# Patient Record
Sex: FEMALE | Race: WHITE
[De-identification: names, ages, dates, MRNs, and addresses within clinical notes are randomized per-mention and may not be internally consistent; named-entity substitution may affect disease eponyms.]

---

## 2019-06-21 ENCOUNTER — HOSPITAL ENCOUNTER (OUTPATIENT)
Dept: HOSPITAL 75 - RAD | Age: 29
End: 2019-06-21
Attending: OBSTETRICS & GYNECOLOGY
Payer: MEDICAID

## 2019-06-21 DIAGNOSIS — Z36.89: Primary | ICD-10-CM

## 2019-06-21 DIAGNOSIS — Z3A.19: ICD-10-CM

## 2019-06-21 PROCEDURE — 76805 OB US >/= 14 WKS SNGL FETUS: CPT

## 2019-06-21 NOTE — DIAGNOSTIC IMAGING REPORT
INDICATION: Fetal survey.



TECHNIQUE: Multiple real-time grayscale images were obtained over

the gravid uterus.



COMPARISON: None.



FINDINGS: There is a single live fetus in a cephalic

presentation. Fetal heart rate was recorded at 160 beats per

minute. Placenta is anterior and fundal. Amniotic fluid volume

appears within normal limits. Cervical length is 3.9 cm. Fetal

survey demonstrates fetal kidneys, bladder, and stomach to be

unremarkable. The brain is unremarkable. There is a four-chamber

heart. There is a three-vessel cord. Cord insertion was not well

visualized on today's study. The spine is unremarkable.



Biometrical measurements are as follows:

Biparietal 4.72 cm, age 20 weeks 2 days.

Head circumference 17.1 cm, age 19 weeks 5 days.

Abdominal circumference 13.6 cm, age 19 weeks 1 days.

Femur length 3.1 cm, age 19 weeks 4 days.



Sonographic estimate age: 19 weeks 5 days.

Sonographic estimated date of delivery: 11/10/2019.



Estimated Fetal Weight: 286 gm (+/- 42  gm).

LMP percentile: 14%.



Fetal heart rate: 160 beats per minute.



Fetal number: 1 of 1.



IMPRESSION: Single live IUP of approximately 19 weeks 5 days

gestational age with estimated date of confinement

sonographically of 11/10/2019. Fetal survey is unremarkable,

although fetal cord insertion was not well demonstrated on

today's study.



Dictated by: 



  Dictated on workstation # [id number]

## 2019-08-22 ENCOUNTER — HOSPITAL ENCOUNTER (OUTPATIENT)
Dept: HOSPITAL 75 - RAD | Age: 29
End: 2019-08-22
Attending: OBSTETRICS & GYNECOLOGY
Payer: MEDICAID

## 2019-08-22 DIAGNOSIS — Z3A.29: ICD-10-CM

## 2019-08-22 DIAGNOSIS — O26.842: Primary | ICD-10-CM

## 2019-08-22 DIAGNOSIS — O21.0: ICD-10-CM

## 2019-08-22 PROCEDURE — 76805 OB US >/= 14 WKS SNGL FETUS: CPT

## 2019-08-22 NOTE — DIAGNOSTIC IMAGING REPORT
INDICATION: Smaller than dates.



TECHNIQUE: Multiple real-time grayscale images were obtained over

the gravid uterus.



COMPARISON: None 06/21/2019.



FINDINGS: There is a single live fetus in a cephalic

presentation. Fetal heart rate was recorded at 140 beats per

minute. Placenta is anterior. Amniotic fluid volume is 17.8 cm.

Survey shows fetal kidneys, bladder stomach to be unremarkable.

The brain is unremarkable. There appears to be a two-vessel cord

present. Cord insertion is unremarkable.



Biometrical measurements are as follows:

Biparietal 7.4 cm, age 29 weeks 4 days.

Head circumference 26.4 cm, age 28 weeks 6 days.

Abdominal circumference 24.3 cm, age 28 weeks 5 days.

Femur length 5.4 cm, age 28 weeks 6 days.



Sonographic estimate age: 29 weeks 0 days.

Sonographic estimated date of delivery: 11/07/2019.



Estimated Fetal Weight: 1270 G gm (+/- 185  gm).

LMP percentile: 31%%.



Fetal heart rate: 140 beats per minute.



Fetal number: 1 of 1.



IMPRESSION: Single live IUP approximately 29 weeks gestational

age showing normal interval growth when compared with prior exam

from 06/21/2019. Fetus appears to have a two-vessel cord.

Followup can be performed.



Dictated by: 



  Dictated on workstation # [id number]

## 2019-11-04 ENCOUNTER — HOSPITAL ENCOUNTER (INPATIENT)
Dept: HOSPITAL 75 - LDRP | Age: 29
LOS: 2 days | Discharge: HOME | End: 2019-11-06
Attending: OBSTETRICS & GYNECOLOGY | Admitting: OBSTETRICS & GYNECOLOGY
Payer: MEDICAID

## 2019-11-04 VITALS — DIASTOLIC BLOOD PRESSURE: 73 MMHG | SYSTOLIC BLOOD PRESSURE: 120 MMHG

## 2019-11-04 VITALS — DIASTOLIC BLOOD PRESSURE: 73 MMHG | SYSTOLIC BLOOD PRESSURE: 122 MMHG

## 2019-11-04 VITALS — SYSTOLIC BLOOD PRESSURE: 118 MMHG | DIASTOLIC BLOOD PRESSURE: 67 MMHG

## 2019-11-04 VITALS — DIASTOLIC BLOOD PRESSURE: 69 MMHG | SYSTOLIC BLOOD PRESSURE: 117 MMHG

## 2019-11-04 VITALS — DIASTOLIC BLOOD PRESSURE: 88 MMHG | SYSTOLIC BLOOD PRESSURE: 133 MMHG

## 2019-11-04 VITALS — SYSTOLIC BLOOD PRESSURE: 125 MMHG | DIASTOLIC BLOOD PRESSURE: 73 MMHG

## 2019-11-04 VITALS — BODY MASS INDEX: 23.73 KG/M2 | WEIGHT: 142.42 LBS | HEIGHT: 65 IN

## 2019-11-04 VITALS — SYSTOLIC BLOOD PRESSURE: 113 MMHG | DIASTOLIC BLOOD PRESSURE: 66 MMHG

## 2019-11-04 VITALS — SYSTOLIC BLOOD PRESSURE: 129 MMHG | DIASTOLIC BLOOD PRESSURE: 74 MMHG

## 2019-11-04 VITALS — DIASTOLIC BLOOD PRESSURE: 59 MMHG | SYSTOLIC BLOOD PRESSURE: 113 MMHG

## 2019-11-04 VITALS — DIASTOLIC BLOOD PRESSURE: 67 MMHG | SYSTOLIC BLOOD PRESSURE: 125 MMHG

## 2019-11-04 VITALS — SYSTOLIC BLOOD PRESSURE: 118 MMHG | DIASTOLIC BLOOD PRESSURE: 74 MMHG

## 2019-11-04 VITALS — SYSTOLIC BLOOD PRESSURE: 109 MMHG | DIASTOLIC BLOOD PRESSURE: 63 MMHG

## 2019-11-04 VITALS — DIASTOLIC BLOOD PRESSURE: 86 MMHG | SYSTOLIC BLOOD PRESSURE: 142 MMHG

## 2019-11-04 VITALS — SYSTOLIC BLOOD PRESSURE: 118 MMHG | DIASTOLIC BLOOD PRESSURE: 78 MMHG

## 2019-11-04 VITALS — DIASTOLIC BLOOD PRESSURE: 72 MMHG | SYSTOLIC BLOOD PRESSURE: 117 MMHG

## 2019-11-04 VITALS — SYSTOLIC BLOOD PRESSURE: 123 MMHG | DIASTOLIC BLOOD PRESSURE: 68 MMHG

## 2019-11-04 VITALS — SYSTOLIC BLOOD PRESSURE: 108 MMHG | DIASTOLIC BLOOD PRESSURE: 61 MMHG

## 2019-11-04 VITALS — DIASTOLIC BLOOD PRESSURE: 69 MMHG | SYSTOLIC BLOOD PRESSURE: 115 MMHG

## 2019-11-04 VITALS — SYSTOLIC BLOOD PRESSURE: 116 MMHG | DIASTOLIC BLOOD PRESSURE: 72 MMHG

## 2019-11-04 VITALS — SYSTOLIC BLOOD PRESSURE: 130 MMHG | DIASTOLIC BLOOD PRESSURE: 83 MMHG

## 2019-11-04 VITALS — SYSTOLIC BLOOD PRESSURE: 110 MMHG | DIASTOLIC BLOOD PRESSURE: 68 MMHG

## 2019-11-04 VITALS — DIASTOLIC BLOOD PRESSURE: 67 MMHG | SYSTOLIC BLOOD PRESSURE: 115 MMHG

## 2019-11-04 VITALS — DIASTOLIC BLOOD PRESSURE: 74 MMHG | SYSTOLIC BLOOD PRESSURE: 118 MMHG

## 2019-11-04 VITALS — DIASTOLIC BLOOD PRESSURE: 75 MMHG | SYSTOLIC BLOOD PRESSURE: 135 MMHG

## 2019-11-04 VITALS — SYSTOLIC BLOOD PRESSURE: 125 MMHG | DIASTOLIC BLOOD PRESSURE: 79 MMHG

## 2019-11-04 VITALS — DIASTOLIC BLOOD PRESSURE: 81 MMHG | SYSTOLIC BLOOD PRESSURE: 134 MMHG

## 2019-11-04 VITALS — DIASTOLIC BLOOD PRESSURE: 78 MMHG | SYSTOLIC BLOOD PRESSURE: 118 MMHG

## 2019-11-04 VITALS — SYSTOLIC BLOOD PRESSURE: 119 MMHG | DIASTOLIC BLOOD PRESSURE: 63 MMHG

## 2019-11-04 VITALS — DIASTOLIC BLOOD PRESSURE: 81 MMHG | SYSTOLIC BLOOD PRESSURE: 124 MMHG

## 2019-11-04 VITALS — DIASTOLIC BLOOD PRESSURE: 82 MMHG | SYSTOLIC BLOOD PRESSURE: 121 MMHG

## 2019-11-04 VITALS — SYSTOLIC BLOOD PRESSURE: 130 MMHG | DIASTOLIC BLOOD PRESSURE: 80 MMHG

## 2019-11-04 VITALS — DIASTOLIC BLOOD PRESSURE: 59 MMHG | SYSTOLIC BLOOD PRESSURE: 124 MMHG

## 2019-11-04 VITALS — SYSTOLIC BLOOD PRESSURE: 105 MMHG | DIASTOLIC BLOOD PRESSURE: 66 MMHG

## 2019-11-04 VITALS — SYSTOLIC BLOOD PRESSURE: 120 MMHG | DIASTOLIC BLOOD PRESSURE: 70 MMHG

## 2019-11-04 VITALS — SYSTOLIC BLOOD PRESSURE: 115 MMHG | DIASTOLIC BLOOD PRESSURE: 69 MMHG

## 2019-11-04 VITALS — DIASTOLIC BLOOD PRESSURE: 78 MMHG | SYSTOLIC BLOOD PRESSURE: 119 MMHG

## 2019-11-04 VITALS — SYSTOLIC BLOOD PRESSURE: 128 MMHG | DIASTOLIC BLOOD PRESSURE: 82 MMHG

## 2019-11-04 VITALS — SYSTOLIC BLOOD PRESSURE: 133 MMHG | DIASTOLIC BLOOD PRESSURE: 67 MMHG

## 2019-11-04 VITALS — SYSTOLIC BLOOD PRESSURE: 129 MMHG | DIASTOLIC BLOOD PRESSURE: 65 MMHG

## 2019-11-04 VITALS — DIASTOLIC BLOOD PRESSURE: 73 MMHG | SYSTOLIC BLOOD PRESSURE: 139 MMHG

## 2019-11-04 VITALS — SYSTOLIC BLOOD PRESSURE: 115 MMHG | DIASTOLIC BLOOD PRESSURE: 68 MMHG

## 2019-11-04 VITALS — SYSTOLIC BLOOD PRESSURE: 111 MMHG | DIASTOLIC BLOOD PRESSURE: 56 MMHG

## 2019-11-04 VITALS — SYSTOLIC BLOOD PRESSURE: 119 MMHG | DIASTOLIC BLOOD PRESSURE: 72 MMHG

## 2019-11-04 VITALS — DIASTOLIC BLOOD PRESSURE: 66 MMHG | SYSTOLIC BLOOD PRESSURE: 113 MMHG

## 2019-11-04 VITALS — DIASTOLIC BLOOD PRESSURE: 72 MMHG | SYSTOLIC BLOOD PRESSURE: 110 MMHG

## 2019-11-04 VITALS — SYSTOLIC BLOOD PRESSURE: 127 MMHG | DIASTOLIC BLOOD PRESSURE: 74 MMHG

## 2019-11-04 VITALS — SYSTOLIC BLOOD PRESSURE: 121 MMHG | DIASTOLIC BLOOD PRESSURE: 65 MMHG

## 2019-11-04 DIAGNOSIS — Z3A.39: ICD-10-CM

## 2019-11-04 LAB
APTT PPP: YELLOW S
BACTERIA #/AREA URNS HPF: (no result) /HPF
BASOPHILS # BLD AUTO: 0 10^3/UL (ref 0–0.1)
BASOPHILS NFR BLD AUTO: 0 % (ref 0–10)
BILIRUB UR QL STRIP: NEGATIVE
EOSINOPHIL # BLD AUTO: 0.1 10^3/UL (ref 0–0.3)
EOSINOPHIL NFR BLD AUTO: 1 % (ref 0–10)
ERYTHROCYTE [DISTWIDTH] IN BLOOD BY AUTOMATED COUNT: 13 % (ref 10–14.5)
FIBRINOGEN PPP-MCNC: CLEAR MG/DL
GLUCOSE UR STRIP-MCNC: NEGATIVE MG/DL
HCT VFR BLD CALC: 38 % (ref 35–52)
HGB BLD-MCNC: 13.1 G/DL (ref 11.5–16)
KETONES UR QL STRIP: (no result)
LEUKOCYTE ESTERASE UR QL STRIP: NEGATIVE
LYMPHOCYTES # BLD AUTO: 1.8 X 10^3 (ref 1–4)
LYMPHOCYTES NFR BLD AUTO: 15 % (ref 12–44)
MANUAL DIFFERENTIAL PERFORMED BLD QL: NO
MCH RBC QN AUTO: 31 PG (ref 25–34)
MCHC RBC AUTO-ENTMCNC: 34 G/DL (ref 32–36)
MCV RBC AUTO: 89 FL (ref 80–99)
MONOCYTES # BLD AUTO: 0.5 X 10^3 (ref 0–1)
MONOCYTES NFR BLD AUTO: 4 % (ref 0–12)
NEUTROPHILS # BLD AUTO: 10 X 10^3 (ref 1.8–7.8)
NEUTROPHILS NFR BLD AUTO: 81 % (ref 42–75)
NITRITE UR QL STRIP: NEGATIVE
PH UR STRIP: 7 [PH] (ref 5–9)
PLATELET # BLD: 220 10^3/UL (ref 130–400)
PMV BLD AUTO: 10.8 FL (ref 7.4–10.4)
PROT UR QL STRIP: NEGATIVE
RBC #/AREA URNS HPF: (no result) /HPF
SP GR UR STRIP: 1 (ref 1.02–1.02)
SQUAMOUS #/AREA URNS HPF: (no result) /HPF
WBC # BLD AUTO: 12.4 10^3/UL (ref 4.3–11)
WBC #/AREA URNS HPF: (no result) /HPF

## 2019-11-04 PROCEDURE — 36415 COLL VENOUS BLD VENIPUNCTURE: CPT

## 2019-11-04 PROCEDURE — 81000 URINALYSIS NONAUTO W/SCOPE: CPT

## 2019-11-04 PROCEDURE — 0HQ9XZZ REPAIR PERINEUM SKIN, EXTERNAL APPROACH: ICD-10-PCS | Performed by: OBSTETRICS & GYNECOLOGY

## 2019-11-04 PROCEDURE — 87088 URINE BACTERIA CULTURE: CPT

## 2019-11-04 PROCEDURE — 86850 RBC ANTIBODY SCREEN: CPT

## 2019-11-04 PROCEDURE — 86901 BLOOD TYPING SEROLOGIC RH(D): CPT

## 2019-11-04 PROCEDURE — 86900 BLOOD TYPING SEROLOGIC ABO: CPT

## 2019-11-04 PROCEDURE — 85025 COMPLETE CBC W/AUTO DIFF WBC: CPT

## 2019-11-04 PROCEDURE — 99212 OFFICE O/P EST SF 10 MIN: CPT

## 2019-11-04 RX ADMIN — IBUPROFEN SCH MG: 600 TABLET ORAL at 23:23

## 2019-11-04 RX ADMIN — SODIUM CHLORIDE, SODIUM LACTATE, POTASSIUM CHLORIDE, CALCIUM CHLORIDE, AND DEXTROSE MONOHYDRATE SCH MLS/HR: 600; 310; 30; 20; 5 INJECTION, SOLUTION INTRAVENOUS at 10:45

## 2019-11-04 RX ADMIN — SODIUM CHLORIDE, SODIUM LACTATE, POTASSIUM CHLORIDE, CALCIUM CHLORIDE, AND DEXTROSE MONOHYDRATE SCH MLS/HR: 600; 310; 30; 20; 5 INJECTION, SOLUTION INTRAVENOUS at 18:01

## 2019-11-04 NOTE — NUR
Arrive to unit with c/o contractions.  Wt obtained and to room 320.  gowned and urine sample 
obtained.  to bed and monitors on.  Oriented to room, call light and surroundings.

## 2019-11-04 NOTE — HISTORY & PHYSICAL-OB
OB - Chief Complaint & HPI


Date/Time


Date of Admission:


Date of Admission:


Date seen by a Provider:  2019


Time Seen by a Provider:  09:45





Chief Complaint/History


OB-Reason for Admission/Chief:  Onset of Labor


Hx :  3


Hx Para:  1


Expected Date of Delivery:  2019


Gestational Age in Weeks:  39


Gestational Age in Days:  3


Admission Nurse Assessment Rev:  Yes





Allergies and Home Medications


Allergies


Coded Allergies:  


     Penicillins (Verified  Allergy, Unknown, 19)


     amoxicillin (Verified  Allergy, Unknown, 19)


     cefaclor (Verified  Allergy, Unknown, 19)


     nitrofurantoin (Verified  Allergy, Unknown, 19)





OB - History


Hx of Present Pregnancy


Prenatal Care:  Yes





Obstetrical History


Hx :  3


Hx Para:  1


Hx Total # of Abortions (Spona:  1





Social History/Family History


Recent Infectious Disease Expo:  No


Alcohol Use:  Denies Use


Recreational Drug Use:  No


2nd Hand Smoke Exposure:  No





Immunizations


Date of Influenza Vaccine:  Oct 25, 2019





OB - Admission Exam


Physical Exam


Vitals:





Vital Signs








 19





 07:57


 


Temp 36.5


 


Pulse 74


 


Resp 16


 


Pulse Ox 99


 


O2 Delivery Room Air








Labs





Laboratory Tests








Test


 19


07:30 Range/Units


 


 


Urine Color YELLOW   


 


Urine Clarity CLEAR   


 


Urine pH 7  5-9  


 


Urine Specific Gravity 1.005 L 1.016-1.022  


 


Urine Protein NEGATIVE  NEGATIVE  


 


Urine Glucose (UA) NEGATIVE  NEGATIVE  


 


Urine Ketones 1+ H NEGATIVE  


 


Urine Nitrite NEGATIVE  NEGATIVE  


 


Urine Bilirubin NEGATIVE  NEGATIVE  


 


Urine Urobilinogen NORMAL  NORMAL  MG/DL


 


Urine Leukocyte Esterase NEGATIVE  NEGATIVE  


 


Urine RBC (Auto) NEGATIVE  NEGATIVE  


 


Urine RBC NONE   /HPF


 


Urine WBC RARE   /HPF


 


Urine Squamous Epithelial


Cells 5-10 


  /HPF





 


Urine Crystals NONE   /LPF


 


Urine Bacteria FEW H  /HPF


 


Urine Casts NONE   /LPF


 


Urine Mucus NEGATIVE   /LPF


 


Urine Culture Indicated NO   

















KAIDEN PEREZ DO                2019 09:49


POS

## 2019-11-04 NOTE — OB LABOR & DELIVERY RECORD
Vag Delivery Note


Vag Delivery Note


Date of Delivery: 19 





Preoperative Diagnosis: Catia Eddy  is a (29  /Para  3 / 1,Gestational 

Age (wks)39with []





Postoperative Diagnosis: Same


Surgeon: KAIDEN PEREZ 





Assistant: YONATAN





Anesthesia: epidurla





Delivery Type: vaginal





Findings: []


Viable ma;le infant, apgars 99, weight pending


Lacerations:  first degree


Intact placenta with 3 vessel cord. No nuchal cord, body cord or shoulder 

dystocia.  compound presentation with right hand at chin








Estimated Blood Loss: 300 ml





Complications: None





Condition: Stable





Description of Procedure:





The patient is a 29 year old female who presented []. She was admitted and 

informed consent was obtained. Her labor course was remarkable for [] She 

progressed to complete dilatation and began to push. 





She was then set up for delivery. The infant's head was delivered atraumatically

 in the [] position. The shoulders and remainder of the infant's body were then 

delivered without difficulty. Upon delivery, the head was held below the level 

of the perineum and the mouth and nares were bulb suctioned. The cord was doubly

 clamped and cut and the infant was handed off to the pediatric staff. An intact

 placenta with 3-vessel cord delivered via [place] and there was found to be 

minimal bleeding.~ Vigorous fundal massage was performed and the fundus was 

found to be firm. IV oxytocin was given. Examination of the vagina and perineum 

revealed a [] laceration repaired in the usual fashion with 3-0 vicryl suture. 

Following the repair, sponge, instrument and needle counts were correct. Mom and

 baby were both in stable condition in the labor suite.





Vitals - Labs


Vital Signs - I&O





Vital Signs








  Date Time  Temp Pulse Resp B/P (MAP) Pulse Ox O2 Delivery O2 Flow Rate FiO2


 


19 20:30  80 18 133/88 (103) 99 Room Air  


 


19 20:15  88 18 111/56 (74) 99 Room Air  


 


19 20:00  78 18 117/69 (85) 99 Room Air  


 


19 19:45  74 18 108/61 (77) 99 Room Air  


 


19 19:30  71 18 113/59 (77) 99 Room Air  


 


19 19:15 36.5 83 18 124/59 (80) 97 Room Air  


 


19 19:00  75 18 105/66 (79) 97 Room Air  


 


19 18:45 36.4 73 18 115/69 (84) 97 Room Air  


 


19 18:40  72 18 110/68 (82) 98 Room Air  


 


19 18:37  75 18 117/72 (87) 98 Room Air  


 


19 18:30  69 18 115/68 (84) 97 Room Air  


 


19 18:25  77 18 125/67 (86) 98 Room Air  


 


19 18:20  73 18 118/74 (89) 98 Room Air  


 


19 18:15  80 18 113/66 (82) 98 Room Air  


 


19 18:10  80 18 113/66 (82) 98 Room Air  


 


19 18:06  87 18 116/72 (87) 98 Room Air  


 


19 18:03  92 18 120/73 (89) 98 Room Air  


 


19 18:00      Room Air  


 


19 17:56  122 18 125/79 (94) 98 Room Air  


 


19 17:51  92 18 119/72 (88) 98 Room Air  


 


19 17:48  85 18 109/63 (78) 98 Room Air  


 


19 17:45      Room Air  


 


19 17:40  93 18 118/67 (84) 99 Room Air  


 


19 17:33  87 18 123/68 (86) 98 Room Air  


 


19 17:30  78 18 129/65 (86) 98 Room Air  


 


19 17:27  82 18 134/81 (98) 98 Room Air  


 


19 17:24  104 18 130/83 (99) 97 Room Air  


 


19 17:21  89 18 122/73 (89) 98 Room Air  


 


19 17:18  84 18 130/80 (97) 98 Room Air  


 


19 17:15  80 18 135/75 (95) 97 Room Air  


 


19 17:10  97 18 124/81 (95) 98 Room Air  


 


19 17:05  85 18 133/67 (89) 98 Room Air  


 


19 17:00      Room Air  


 


19 16:55 37.4 87 18 127/74 (91) 99 Room Air  


 


19 16:45      Room Air  


 


19 16:30  78 18 142/86 (104)  Room Air  


 


19 16:00  71 18 121/82 (95)  Room Air  


 


19 15:30 37.1 71 18 115/69 (84)  Room Air  


 


19 15:00  68 18 118/74 (89)  Room Air  


 


19 14:30 36.7 76 18 119/78 (92)  Room Air  


 


19 09:00 36.5 74 18 118/78 (91) 99 Room Air  


 


19 07:57 36.5 74 16  99 Room Air  


 


19 07:50 36.5 76 18  99 Room Air  











Labs


Laboratory Tests


19 07:30: 


Urine Color YELLOW, Urine Clarity CLEAR, Urine pH 7, Urine Specific Gravity 

1.005L, Urine Protein NEGATIVE, Urine Glucose (UA) NEGATIVE, Urine Ketones 1+H, 

Urine Nitrite NEGATIVE, Urine Bilirubin NEGATIVE, Urine Urobilinogen NORMAL, 

Urine Leukocyte Esterase NEGATIVE, Urine RBC (Auto) NEGATIVE, Urine RBC NONE, 

Urine WBC RARE, Urine Squamous Epithelial Cells 5-10, Urine Crystals NONE, Urine

 Bacteria FEWH, Urine Casts NONE, Urine Mucus NEGATIVE, Urine Culture Indicated 

NO


19 10:20: 


White Blood Count 12.4H, Red Blood Count 4.29L, Hemoglobin 13.1, Hematocrit 38, 

Mean Corpuscular Volume 89, Mean Corpuscular Hemoglobin 31, Mean Corpuscular 

Hemoglobin Concent 34, Red Cell Distribution Width 13.0, Platelet Count 220, 

Mean Platelet Volume 10.8H, Neutrophils (%) (Auto) 81H, Lymphocytes (%) (Auto) 

15, Monocytes (%) (Auto) 4, Eosinophils (%) (Auto) 1, Basophils (%) (Auto) 0, 

Neutrophils # (Auto) 10.0H, Lymphocytes # (Auto) 1.8, Monocytes # (Auto) 0.5, 

Eosinophils # (Auto) 0.1, Basophils # (Auto) 0.0











KAIDEN PEREZ DO                2019 22:02


POS

## 2019-11-04 NOTE — NUR
ff1 below. light rubra noted. janette care completed. pad and panties applied. pt assisted to 
w'c and taken down to room 310. pt assisted to bathroom. unable to void at this time. pt 
ambulated to bed. ice pack applied to perineum. call light within reach. pt denies any 
further needs at this time. will continue to monitor

## 2019-11-05 VITALS — SYSTOLIC BLOOD PRESSURE: 115 MMHG | DIASTOLIC BLOOD PRESSURE: 72 MMHG

## 2019-11-05 VITALS — SYSTOLIC BLOOD PRESSURE: 117 MMHG | DIASTOLIC BLOOD PRESSURE: 67 MMHG

## 2019-11-05 VITALS — SYSTOLIC BLOOD PRESSURE: 109 MMHG | DIASTOLIC BLOOD PRESSURE: 61 MMHG

## 2019-11-05 VITALS — SYSTOLIC BLOOD PRESSURE: 102 MMHG | DIASTOLIC BLOOD PRESSURE: 58 MMHG

## 2019-11-05 VITALS — SYSTOLIC BLOOD PRESSURE: 108 MMHG | DIASTOLIC BLOOD PRESSURE: 63 MMHG

## 2019-11-05 LAB
BASOPHILS # BLD AUTO: 0 10^3/UL (ref 0–0.1)
BASOPHILS NFR BLD AUTO: 0 % (ref 0–10)
EOSINOPHIL # BLD AUTO: 0.1 10^3/UL (ref 0–0.3)
EOSINOPHIL NFR BLD AUTO: 1 % (ref 0–10)
ERYTHROCYTE [DISTWIDTH] IN BLOOD BY AUTOMATED COUNT: 13.1 % (ref 10–14.5)
HCT VFR BLD CALC: 34 % (ref 35–52)
HGB BLD-MCNC: 11.5 G/DL (ref 11.5–16)
LYMPHOCYTES # BLD AUTO: 2.2 X 10^3 (ref 1–4)
LYMPHOCYTES NFR BLD AUTO: 15 % (ref 12–44)
MANUAL DIFFERENTIAL PERFORMED BLD QL: NO
MCH RBC QN AUTO: 31 PG (ref 25–34)
MCHC RBC AUTO-ENTMCNC: 34 G/DL (ref 32–36)
MCV RBC AUTO: 90 FL (ref 80–99)
MONOCYTES # BLD AUTO: 0.9 X 10^3 (ref 0–1)
MONOCYTES NFR BLD AUTO: 6 % (ref 0–12)
NEUTROPHILS # BLD AUTO: 11.2 X 10^3 (ref 1.8–7.8)
NEUTROPHILS NFR BLD AUTO: 78 % (ref 42–75)
PLATELET # BLD: 205 10^3/UL (ref 130–400)
PMV BLD AUTO: 10.4 FL (ref 7.4–10.4)
WBC # BLD AUTO: 14.4 10^3/UL (ref 4.3–11)

## 2019-11-05 RX ADMIN — FERROUS SULFATE TAB 325 MG (65 MG ELEMENTAL FE) SCH MG: 325 (65 FE) TAB at 08:38

## 2019-11-05 RX ADMIN — IBUPROFEN SCH MG: 600 TABLET ORAL at 18:45

## 2019-11-05 RX ADMIN — ACETAMINOPHEN SCH MG: 500 TABLET ORAL at 02:14

## 2019-11-05 RX ADMIN — ACETAMINOPHEN SCH MG: 500 TABLET ORAL at 18:45

## 2019-11-05 RX ADMIN — DOCUSATE SODIUM SCH MG: 100 CAPSULE ORAL at 08:38

## 2019-11-05 RX ADMIN — DOCUSATE SODIUM SCH MG: 100 CAPSULE ORAL at 21:59

## 2019-11-05 RX ADMIN — VITAMIN A ACETATE, .BETA.-CAROTENE, ASCORBIC ACID, CHOLECALCIFEROL, .ALPHA.-TOCOPHEROL ACETATE, DL-, THIAMINE MONONITRATE, RIBOFLAVIN, NIACINAMIDE, PYRIDOXINE HYDROCHLORIDE, FOLIC ACID, CYANOCOBALAMIN, CALCIUM CARBONATE, FERROUS FUMARATE, ZINC OXIDE, AND CUPRIC OXIDE SCH EA: 2000; 2000; 120; 400; 22; 1.84; 3; 20; 10; 1; 12; 200; 27; 25; 2 TABLET ORAL at 08:38

## 2019-11-05 RX ADMIN — IBUPROFEN SCH MG: 600 TABLET ORAL at 05:50

## 2019-11-05 RX ADMIN — ACETAMINOPHEN SCH MG: 500 TABLET ORAL at 10:42

## 2019-11-05 RX ADMIN — IBUPROFEN SCH MG: 600 TABLET ORAL at 12:16

## 2019-11-05 NOTE — POSTPARTUM PROGRESS NOTE
Postpartum Note


Postpartum Note


Postpartum Day # []





Subjective:


Patient is without complaints. Ambulating, voiding. Tolerating a regular diet 

without nausea or vomiting. Normal lochia. Pain is well controlled with oral 

pain medications. [] feeding. []





Objective:











 11/4/19 11/4/19 11/4/19 11/4/19





 20:45 21:00 21:30 21:45


 


Pulse 77 102 89 92


 


Resp 18 18 18 18


 


B/P (MAP) 128/82 (97) 139/73 (95) 125/73 (90) 129/74 (92)


 


Pulse Ox 99 99 99 99


 


O2 Delivery Room Air Room Air Room Air Room Air





 11/4/19 11/4/19 11/4/19 11/4/19





 22:00 22:15 22:30 22:45


 


Temp  36.5  36.5


 


Pulse 86 90 108 88


 


Resp 18 18 18 18


 


B/P (MAP) 110/72 (85) 119/63 (81) 121/65 (83) 120/70 (87)


 


Pulse Ox 99 99 99 99


 


O2 Delivery Room Air Room Air Room Air Room Air


 


    





 11/4/19 11/5/19  





 23:00 02:23  


 


Temp  36.5  


 


Pulse 90 72  


 


Resp 18 18  


 


B/P (MAP) 115/67 (83) 102/58 (73)  


 


Pulse Ox 99 99  


 


O2 Delivery Room Air Room Air  














 11/5/19





 00:00


 


Intake Total 1650 ml


 


Output Total 300 ml


 


Balance 1350 ml








Laboratory Tests








Test


 11/4/19


10:20 11/5/19


06:04 Range/Units


 


 


White Blood Count


 12.4 H


 14.4 H


 4.3-11.0


10^3/uL


 


Red Blood Count


 4.29 L


 3.75 L


 4.35-5.85


10^6/uL


 


Hemoglobin 13.1  11.5  11.5-16.0  G/DL


 


Hematocrit 38  34 L 35-52  %


 


Mean Corpuscular Volume 89  90  80-99  FL


 


Mean Corpuscular Hemoglobin 31  31  25-34  PG


 


Mean Corpuscular Hemoglobin


Concent 34 


 34 


 32-36  G/DL





 


Red Cell Distribution Width 13.0  13.1  10.0-14.5  %


 


Platelet Count


 220 


 205 


 130-400


10^3/uL


 


Mean Platelet Volume 10.8 H 10.4  7.4-10.4  FL


 


Neutrophils (%) (Auto) 81 H 78 H 42-75  %


 


Lymphocytes (%) (Auto) 15  15  12-44  %


 


Monocytes (%) (Auto) 4  6  0-12  %


 


Eosinophils (%) (Auto) 1  1  0-10  %


 


Basophils (%) (Auto) 0  0  0-10  %


 


Neutrophils # (Auto) 10.0 H 11.2 H 1.8-7.8  X 10^3


 


Lymphocytes # (Auto) 1.8  2.2  1.0-4.0  X 10^3


 


Monocytes # (Auto) 0.5  0.9  0.0-1.0  X 10^3


 


Eosinophils # (Auto)


 0.1 


 0.1 


 0.0-0.3


10^3/uL


 


Basophils # (Auto)


 0.0 


 0.0 


 0.0-0.1


10^3/uL











Physical Exam:


General - Alert and oriented, no apparent distress


Abdomen - Soft, appropriately tender to palpation, non-distended, fundus firm at

 umbilicus


Extremities - no edema, negative Dionne's bilaterally 


[]





Assessment:


[] post-partum day # [], status post [] vaginal delivery.


Recovering well, hemodynamically stable





[]








Plan:


Routine postpartum care.


Encourage breast feeding.


Encourage ambulation.


Ferrous sulfate supplementation.


Plan for discharge []





Vitals - Labs


Vital Signs - I&O





Vital Signs








  Date Time  Temp Pulse Resp B/P (MAP) Pulse Ox O2 Delivery O2 Flow Rate FiO2


 


11/5/19 02:23 36.5 72 18 102/58 (73) 99 Room Air  


 


11/4/19 23:00  90 18 115/67 (83) 99 Room Air  


 


11/4/19 22:45 36.5 88 18 120/70 (87) 99 Room Air  


 


11/4/19 22:30  108 18 121/65 (83) 99 Room Air  


 


11/4/19 22:15 36.5 90 18 119/63 (81) 99 Room Air  


 


11/4/19 22:00  86 18 110/72 (85) 99 Room Air  


 


11/4/19 21:45  92 18 129/74 (92) 99 Room Air  


 


11/4/19 21:30  89 18 125/73 (90) 99 Room Air  


 


11/4/19 21:00  102 18 139/73 (95) 99 Room Air  


 


11/4/19 20:45  77 18 128/82 (97) 99 Room Air  


 


11/4/19 20:30  80 18 133/88 (103) 99 Room Air  


 


11/4/19 20:15  88 18 111/56 (74) 99 Room Air  


 


11/4/19 20:00  78 18 117/69 (85) 99 Room Air  


 


11/4/19 19:45  74 18 108/61 (77) 99 Room Air  


 


11/4/19 19:30  71 18 113/59 (77) 99 Room Air  


 


11/4/19 19:15 36.5 83 18 124/59 (80) 97 Room Air  


 


11/4/19 19:00  75 18 105/66 (79) 97 Room Air  


 


11/4/19 18:45 36.4 73 18 115/69 (84) 97 Room Air  


 


11/4/19 18:40  72 18 110/68 (82) 98 Room Air  


 


11/4/19 18:37  75 18 117/72 (87) 98 Room Air  


 


11/4/19 18:30  69 18 115/68 (84) 97 Room Air  


 


11/4/19 18:25  77 18 125/67 (86) 98 Room Air  


 


11/4/19 18:20  73 18 118/74 (89) 98 Room Air  


 


11/4/19 18:15  80 18 113/66 (82) 98 Room Air  


 


11/4/19 18:10  80 18 113/66 (82) 98 Room Air  


 


11/4/19 18:06  87 18 116/72 (87) 98 Room Air  


 


11/4/19 18:03  92 18 120/73 (89) 98 Room Air  


 


11/4/19 18:00      Room Air  


 


11/4/19 17:56  122 18 125/79 (94) 98 Room Air  


 


11/4/19 17:51  92 18 119/72 (88) 98 Room Air  


 


11/4/19 17:48  85 18 109/63 (78) 98 Room Air  


 


11/4/19 17:45      Room Air  


 


11/4/19 17:40  93 18 118/67 (84) 99 Room Air  


 


11/4/19 17:33  87 18 123/68 (86) 98 Room Air  


 


11/4/19 17:30  78 18 129/65 (86) 98 Room Air  


 


11/4/19 17:27  82 18 134/81 (98) 98 Room Air  


 


11/4/19 17:24  104 18 130/83 (99) 97 Room Air  


 


11/4/19 17:21  89 18 122/73 (89) 98 Room Air  


 


11/4/19 17:18  84 18 130/80 (97) 98 Room Air  


 


11/4/19 17:15  80 18 135/75 (95) 97 Room Air  


 


11/4/19 17:10  97 18 124/81 (95) 98 Room Air  


 


11/4/19 17:05  85 18 133/67 (89) 98 Room Air  


 


11/4/19 17:00      Room Air  


 


11/4/19 16:55 37.4 87 18 127/74 (91) 99 Room Air  


 


11/4/19 16:45      Room Air  


 


11/4/19 16:30  78 18 142/86 (104)  Room Air  


 


11/4/19 16:00  71 18 121/82 (95)  Room Air  


 


11/4/19 15:30 37.1 71 18 115/69 (84)  Room Air  


 


11/4/19 15:00  68 18 118/74 (89)  Room Air  


 


11/4/19 14:30 36.7 76 18 119/78 (92)  Room Air  


 


11/4/19 09:00 36.5 74 18 118/78 (91) 99 Room Air  














I & O 


 


 11/5/19





 07:00


 


Intake Total 1650 ml


 


Output Total 300 ml


 


Balance 1350 ml











Labs


Laboratory Tests


11/4/19 10:20: 


White Blood Count 12.4H, Red Blood Count 4.29L, Hemoglobin 13.1, Hematocrit 38, 

Mean Corpuscular Volume 89, Mean Corpuscular Hemoglobin 31, Mean Corpuscular 

Hemoglobin Concent 34, Red Cell Distribution Width 13.0, Platelet Count 220, 

Mean Platelet Volume 10.8H, Neutrophils (%) (Auto) 81H, Lymphocytes (%) (Auto) 

15, Monocytes (%) (Auto) 4, Eosinophils (%) (Auto) 1, Basophils (%) (Auto) 0, 

Neutrophils # (Auto) 10.0H, Lymphocytes # (Auto) 1.8, Monocytes # (Auto) 0.5, 

Eosinophils # (Auto) 0.1, Basophils # (Auto) 0.0


11/5/19 06:04: 


White Blood Count 14.4H, Red Blood Count 3.75L, Hemoglobin 11.5, Hematocrit 34L,

 Mean Corpuscular Volume 90, Mean Corpuscular Hemoglobin 31, Mean Corpuscular 

Hemoglobin Concent 34, Red Cell Distribution Width 13.1, Platelet Count 205, 

Mean Platelet Volume 10.4, Neutrophils (%) (Auto) 78H, Lymphocytes (%) (Auto) 1

5, Monocytes (%) (Auto) 6, Eosinophils (%) (Auto) 1, Basophils (%) (Auto) 0, 

Neutrophils # (Auto) 11.2H, Lymphocytes # (Auto) 2.2, Monocytes # (Auto) 0.9, 

Eosinophils # (Auto) 0.1, Basophils # (Auto) 0.0





Microbiology


11/4/19 Urine Culture - Final, Complete


          NO GROWTH











KAIDEN PEREZ DO               Nov 5, 2019 8:39 am


POS

## 2019-11-05 NOTE — NUR
CONTINUES TO CARE FOR INFANT IN ROOM. DOING WELL. C/O CRAMPING AT INTERVALS BUT DENIES PAIN 
THE MAJORITY OF THE TIME.

## 2019-11-05 NOTE — ANESTHESIA-REGIONAL POST-OP
Regional


Patient Condition


Mental Status:  Alert, Oriented x3


Circulation:  Same as Pre-Op


Headache:  Absent


Sensation:  Full Recovery


Motor Block:  Absent





Post Op Complications


Complications


None





Follow Up Care/Instructions


Patient Instructions


None needed.





Anesthesia/Patient Condition


Patient is doing well, no complaints, stable vital signs, no apparent adverse 

anesthesia problems.   


No complications reported per nursing.











AD GABRIEL CRNA          Nov 5, 2019 13:17


POS

## 2019-11-06 VITALS — DIASTOLIC BLOOD PRESSURE: 65 MMHG | SYSTOLIC BLOOD PRESSURE: 107 MMHG

## 2019-11-06 VITALS — DIASTOLIC BLOOD PRESSURE: 61 MMHG | SYSTOLIC BLOOD PRESSURE: 98 MMHG

## 2019-11-06 VITALS — SYSTOLIC BLOOD PRESSURE: 115 MMHG | DIASTOLIC BLOOD PRESSURE: 69 MMHG

## 2019-11-06 RX ADMIN — IBUPROFEN SCH MG: 600 TABLET ORAL at 12:22

## 2019-11-06 RX ADMIN — ACETAMINOPHEN SCH MG: 500 TABLET ORAL at 12:21

## 2019-11-06 RX ADMIN — DOCUSATE SODIUM SCH MG: 100 CAPSULE ORAL at 09:21

## 2019-11-06 RX ADMIN — IBUPROFEN SCH MG: 600 TABLET ORAL at 00:59

## 2019-11-06 RX ADMIN — ACETAMINOPHEN SCH MG: 500 TABLET ORAL at 03:54

## 2019-11-06 RX ADMIN — FERROUS SULFATE TAB 325 MG (65 MG ELEMENTAL FE) SCH MG: 325 (65 FE) TAB at 09:21

## 2019-11-06 RX ADMIN — IBUPROFEN SCH MG: 600 TABLET ORAL at 06:09

## 2019-11-06 RX ADMIN — VITAMIN A ACETATE, .BETA.-CAROTENE, ASCORBIC ACID, CHOLECALCIFEROL, .ALPHA.-TOCOPHEROL ACETATE, DL-, THIAMINE MONONITRATE, RIBOFLAVIN, NIACINAMIDE, PYRIDOXINE HYDROCHLORIDE, FOLIC ACID, CYANOCOBALAMIN, CALCIUM CARBONATE, FERROUS FUMARATE, ZINC OXIDE, AND CUPRIC OXIDE SCH EA: 2000; 2000; 120; 400; 22; 1.84; 3; 20; 10; 1; 12; 200; 27; 25; 2 TABLET ORAL at 09:21

## 2019-11-06 NOTE — DISCHARGE INST-WOMEN'S SERVICE
Discharge Inst-Women's Serv


Depart Medication/Instructions


New, Converted or Re-Newed RX:  RX on Chart


Final Diagnosis


labor 


vaginal delivery


Problems Reviewed?:  Yes





Consults/Follow Up


Additional Follow Up:  Yes





Activity


Activity:  Activity as Tolerated


Driving Instructions:  You May Drive


NO SMOKING:  NO SMOKING


Nothing Inside Vagina:  No Douching, No Garrochales, No Tampons





Diet


Discharge Diet:  No Restrictions


Symptoms to Report to :  Swelling Increased, Bleeding Excessive, Fever Over 1

01 Degrees F, Vaginal Bleeding Increase, Cramps in Feet or Legs, Vaginal 

Discharge Foul


For Any Problems or Questions:  Contact Your Physician





Skin/Wound Care


Bathing Instructions:  KAIDEN Elkins DO                Nov 6, 2019 12:00


POS

## 2019-11-06 NOTE — POSTPARTUM PROGRESS NOTE
Postpartum Note


Postpartum Note


Postpartum Day # 2 s/p 





Subjective:


Patient is without complaints. Ambulating, voiding. Tolerating a regular diet w

ithout nausea or vomiting. Normal lochia. Pain is well controlled with oral pain

medications. breast feeding. 





Objective:











 19





 03:54 09:00


 


Temp 37.1 36.6


 


Pulse 68 89


 


Resp 18 18


 


B/P (MAP) 98/61 (73) 115/69 (84)


 


Pulse Ox 97 98


 


O2 Delivery Room Air Room Air











Physical Exam:


General - Alert and oriented, no apparent distress


Abdomen - Soft, appropriately tender to palpation, non-distended, fundus firm at

umbilicus


Extremities - no edema, negative Dionne's bilaterally 


[]





Assessment:


[] post-partum day # [], status post [] vaginal delivery.


Recovering well, hemodynamically stable





[]








Plan:


Routine postpartum care.


Encourage breast feeding.


Encourage ambulation.


Ferrous sulfate supplementation.


Plan for discharge []





Vitals - Labs


Vital Signs - I&O





Vital Signs








  Date Time  Temp Pulse Resp B/P (MAP) Pulse Ox O2 Delivery O2 Flow Rate FiO2


 


19 09:00 36.6 89 18 115/69 (84) 98 Room Air  


 


19 03:54 37.1 68 18 98/61 (73) 97 Room Air  


 


19 21:59 36.8 67 18 115/72 (86) 98 Room Air  


 


19 16:15 37.2 76 18 117/67 (84) 97 Room Air  


 


19 12:00 36.8 77 18 109/61 (77) 96 Room Air  











Labs





Microbiology


19 Urine Culture - Final, Complete


          NO GROWTH











KAIDEN PEREZ DO                2019 10:40


POS

## 2019-11-06 NOTE — NUR
DISMISSED AMB FROM [facility] WITH INFANT IN STABLE CONDITION TO FAMILY CAR ACC BY S.O., MOTHER, 
OLDER CHILD, AND LEONILA HAMILTON RN.

## 2019-11-06 NOTE — NUR
A.M. ASSESSMENT COMPLETED. VSS. ENCOURAGED AMBULATION IN THE [place] TODAY. CARING FOR  INFANT 
IN ROOM.

## 2019-11-06 NOTE — NUR
DISCHARGE INSTRUCTIONS REVIEWED WITH COPY TO PT. RXS CALLED TO [facility] 
PER PT REQUEST. STATES UNDERSTANDING OF ALL INSTRUCTIONS AND NEED TO F/U AS SCHEDULED AND AS 
NEEDED.

## 2023-06-16 NOTE — NUR
CC:  Leonides Cobb is here today for New Patient (Referred by Lyla Santana MD for Closed fracture of facial bone due to fall, initial encounter)    Medications: medications verified and updated  Added preferred pharmacy  Denies  known Latex allergy or symptoms of Latex sensitivity.  All allergies and medications reviewed.  Patient would like communication of their results via:      Home Phone: [phone] (home)  Okay to leave a message containing results? Yes      Rooming documentation of social history includes tobacco screening only.     DR. PEREZ HERE TO SEE PT. PLAN FOR DISCHARGE.